# Patient Record
Sex: MALE | Race: AMERICAN INDIAN OR ALASKA NATIVE | NOT HISPANIC OR LATINO
[De-identification: names, ages, dates, MRNs, and addresses within clinical notes are randomized per-mention and may not be internally consistent; named-entity substitution may affect disease eponyms.]

---

## 2020-06-01 PROBLEM — Z00.00 ENCOUNTER FOR PREVENTIVE HEALTH EXAMINATION: Status: ACTIVE | Noted: 2020-06-01

## 2020-06-09 ENCOUNTER — APPOINTMENT (OUTPATIENT)
Dept: ENDOCRINOLOGY | Facility: CLINIC | Age: 65
End: 2020-06-09

## 2021-03-07 ENCOUNTER — TRANSCRIPTION ENCOUNTER (OUTPATIENT)
Age: 66
End: 2021-03-07

## 2021-09-06 ENCOUNTER — EMERGENCY (EMERGENCY)
Facility: HOSPITAL | Age: 66
LOS: 1 days | Discharge: HOME | End: 2021-09-06
Payer: MEDICARE

## 2021-09-06 VITALS
SYSTOLIC BLOOD PRESSURE: 198 MMHG | OXYGEN SATURATION: 99 % | DIASTOLIC BLOOD PRESSURE: 97 MMHG | TEMPERATURE: 98 F | RESPIRATION RATE: 16 BRPM | HEART RATE: 80 BPM

## 2021-09-06 LAB — SARS-COV-2 RNA SPEC QL NAA+PROBE: SIGNIFICANT CHANGE UP

## 2021-09-06 PROCEDURE — U0003: CPT

## 2021-09-06 PROCEDURE — U0005: CPT

## 2021-09-06 PROCEDURE — 99283 EMERGENCY DEPT VISIT LOW MDM: CPT

## 2021-09-06 PROCEDURE — 99282 EMERGENCY DEPT VISIT SF MDM: CPT | Mod: CS

## 2021-09-06 NOTE — ED PROVIDER NOTE - DOMESTIC TRAVEL HIGH RISK QUESTION
"NEUROLOGY FLOOR CONSULT    Reason for consult: Stroke    Informant: ER physician       Other sources of information : Patient and chart review    CC:  "Right arm weakness"    HPI:   Pedro Ponce Jr. is a 79 y.o. year old RHM with history of HTN, DM ,  HLD, BPH was stroke activated this morning with symptoms of right arm numbness and weakness that started over the last 2 days and progressing. Patient mention he was squeezing a ball with his hands daily for strength and noticed some weakness over the past two days. Patient also mention tingling in all fingers of B/L hands and B/L legs left L>R. He is noted to have BP of 176/80's  While in ER and currently on 5 antihypertensive pills. During stroke activation patients NIHSS of 1 for right hand droop. Patient mentions he uses glasses at baseline, denies any vision problems, speech problems or balance issues. Per patient s wife his face is at his baseline and uses dentures. He underwent hernia repair on Monday and recently visiting ER 2-3 times over the last month for uncontrolled BP. Patient does workout at gym, last time he went to gym was 2 weeks ago.    Patient admits to taking baby aspirin everyday    ROS: As per HPI    Histories:     Allergies:  Pcn [penicillins]    Current Medications:    Current Facility-Administered Medications   Medication Dose Route Frequency Provider Last Rate Last Dose    aspirin EC tablet 81 mg  81 mg Oral Daily Sariah Slade NP   81 mg at 12/06/20 1223    atorvastatin tablet 40 mg  40 mg Oral Daily Sariah Slade NP   40 mg at 12/06/20 1223    enoxaparin injection 40 mg  40 mg Subcutaneous Q24H Sariah Slade NP        labetaloL injection 10 mg  10 mg Intravenous Q6H PRN Sariah Slade NP        ondansetron disintegrating tablet 8 mg  8 mg Oral Q8H PRN Sariah Slade NP        ondansetron injection 4 mg  4 mg Intravenous Q8H PRN Sariah Slade NP     "    sodium chloride 0.9% flush 10 mL  10 mL Intravenous PRN Sariah Slade NP         Current Outpatient Medications   Medication Sig Dispense Refill    aspirin 81 mg Tab Take 81 mg by mouth. 1 Tablet Oral Every evening      atorvastatin (LIPITOR) 10 MG tablet Take 10 mg by mouth every evening.      chlorhexidine (PERIDEX) 0.12 % solution RINSE 1/2 OZ TWICE DAILY AFTER BREAKFAST AND BEFORE BEDTIME      dutasteride (AVODART) 0.5 mg capsule TAKE 1 CAPSULE (0.5 MG TOTAL) BY MOUTH ONCE DAILY. 90 capsule 3    flu vacc [id number],65yr up,PF (FLUZONE HIGH-DOSE 2019-20, PF,) 180 mcg/0.5 mL Syrg uud 0.5 mL 0    flu vacc [id number],65yr up,PF (FLUZONE HIGH-DOSE 2019-20, PF,) 180 mcg/0.5 mL Syrg admin as directed 0.5 mL 0    FLUZONE HIGHDOSE QUAD 20-21  mcg/0.7 mL Syrg TO BE ADMINISTERED BY PHARMACIST FOR IMMUNIZATION      hydrALAZINE (APRESOLINE) 50 MG tablet Take 100 mg by mouth 3 (three) times daily. Per Dr. Mcdonald       irbesartan (AVAPRO) 300 MG tablet TAKE 1 TABLET EVERY DAY 90 tablet 3    metoprolol tartrate (LOPRESSOR) 50 MG tablet Take 0.5 tablets (25 mg total) by mouth 2 (two) times daily. 90 tablet 3    NIFEdipine (PROCARDIA-XL) 60 MG (OSM) 24 hr tablet Take 1 tablet (60 mg total) by mouth 2 (two) times daily. 180 tablet 4    oxybutynin (DITROPAN-XL) 10 MG 24 hr tablet Take 1 tablet (10 mg total) by mouth once daily. 90 tablet 3    oxyCODONE-acetaminophen (PERCOCET) 5-325 mg per tablet Take 1 tablet by mouth every 4 to 6 hours as needed for Pain. 20 tablet 0    senna-docusate 8.6-50 mg (SENNA WITH DOCUSATE SODIUM) 8.6-50 mg per tablet Take 1 tablet by mouth once daily. 7 tablet 0    spironolactone (ALDACTONE) 50 MG tablet Take 1 tablet (50 mg total) by mouth once daily. 90 tablet 3    tamsulosin (FLOMAX) 0.4 mg Cap Take 1 capsule (0.4 mg total) by mouth every evening. 90 capsule 3    varicella-zoster [id number], PF, (SHINGRIX, PF,) 50 mcg/0.5 mL injection Inject into the muscle. 0.5  mL 1       Past Medical/Surgical/Family History:  Medical:   Past Medical History:   Diagnosis Date    BPH (benign prostatic hypertrophy)     Cataract     Colon polyp     Diabetes mellitus     Diabetes mellitus type II     diet-controlled    Glaucoma (increased eye pressure)     Gout, joint     Hyperlipidemia     Hypertension     Macular degeneration     Obesity     Retinal pigment epithelial detachment 3/15/2013      Surgeries:   Past Surgical History:   Procedure Laterality Date    COLONOSCOPY N/A 4/9/2018    Procedure: COLONOSCOPY;  Surgeon: HAIM Mir MD;  Location: [facility] ENDO (4TH FLR);  Service: Endoscopy;  Laterality: N/A;    COLONOSCOPY N/A 1/14/2019    Procedure: COLONOSCOPY;  Surgeon: HAIM Mir MD;  Location: [facility] ENDO (4TH FLR);  Service: Endoscopy;  Laterality: N/A;  PM prep    CYST REMOVAL  2000    removed from left kidney    KIDNEY SURGERY      PROSTATE SURGERY        Family:   Family History   Problem Relation Age of Onset    Cancer Mother         unkn ca    Diabetes Mother     Glaucoma Mother     Heart disease Mother     Hypertension Mother     Blindness Neg Hx     Macular degeneration Neg Hx     Retinal detachment Neg Hx    , no family history of nerve or muscle disease    Social History:    Substance Abuse/Dependence History:  Tobacco: denied  EtOH: social drinker in the past not currently drinking  Ilicits: None    Occupational/Employment History:  Occupation: Retired      Current Evaluation:     Vital Signs:   Vitals:    12/06/20 1159   BP: (!) 148/70   Pulse: 98   Resp: 20   Temp: 98.2 °F (36.8 °C)      NIHSS - on my exam is 0, but tingling sensation in B/L upper and left lower>right    ORIENTATION: AOx4    MEMORY: recent and remote memory intact    LANGUAGE: 0=No aphasia, normal    CRANIAL NERVES:  Pupils small less reactive B/L, EOMs intact, no facial asymmetry, tongue, palate midline, no dysarthria, SCM & TPZ intact    MOTOR:  Pronator drift:  absent    Right UE  5/5  Left UE   5/5  Right deltoid  5/5  Left deltoid  5/5  Right biceps  5/5  Left biceps  5/5  Right hip flexor  5/5  Left hip flexor  5/5  Right anterior tibialis  5/5  Left anterior tibialis  5/5  Ankle - plantar and dorsal flexion 5/5 bilaterally and inversion and eversion 5/5 bilaterally    no evidence of contractility    Tone: normal    DTR'S:  1+ B/L biceps, triceps, 2+ B/L knee and ankle  Babinski down going bilaterally    SENSORY:  Intact to light touch and vibration. Patient mentions he has tingling sensation in both hands and left ankle more than right    CEREBELLAR/GAIT:  Finger to nose:normal  Heel to shin:normal  Gait: Unable to assess, ambulates normally at home    LABORATORY STUDIES:  Recent Results (from the past 24 hour(s))   POCT glucose    Collection Time: 12/06/20  9:10 AM   Result Value Ref Range    POCT Glucose 111 (H) 70 - 110 mg/dL   ISTAT PROCEDURE    Collection Time: 12/06/20  9:10 AM   Result Value Ref Range    POC PTWBT 11.7 9.7 - 14.3 sec    POC PTINR 1.0 0.9 - 1.2    Sample VENOUS     DelSys Room Air    ISTAT CREATININE    Collection Time: 12/06/20  9:12 AM   Result Value Ref Range    POC Creatinine 1.3 0.5 - 1.4 mg/dL    Sample VENOUS     DelSys Room Air    CBC W/ AUTO DIFFERENTIAL    Collection Time: 12/06/20  9:15 AM   Result Value Ref Range    WBC 4.42 3.90 - 12.70 K/uL    RBC 4.52 (L) 4.60 - 6.20 M/uL    Hemoglobin 14.1 14.0 - 18.0 g/dL    Hematocrit 41.5 40.0 - 54.0 %    MCV 92 82 - 98 fL    MCH 31.2 (H) 27.0 - 31.0 pg    MCHC 34.0 32.0 - 36.0 g/dL    RDW 13.4 11.5 - 14.5 %    Platelets 259 150 - 350 K/uL    MPV 9.7 9.2 - 12.9 fL    Immature Granulocytes 0.2 0.0 - 0.5 %    Gran # (ANC) 2.1 1.8 - 7.7 K/uL    Immature Grans (Abs) 0.01 0.00 - 0.04 K/uL    Lymph # 1.8 1.0 - 4.8 K/uL    Mono # 0.5 0.3 - 1.0 K/uL    Eos # 0.1 0.0 - 0.5 K/uL    Baso # 0.04 0.00 - 0.20 K/uL    nRBC 0 0 /100 WBC    Gran % 46.4 38.0 - 73.0 %    Lymph % 39.8 18.0 - 48.0 %    Mono  % 10.2 4.0 - 15.0 %    Eosinophil % 2.5 0.0 - 8.0 %    Basophil % 0.9 0.0 - 1.9 %    Differential Method Automated    Comprehensive metabolic panel    Collection Time: 12/06/20  9:15 AM   Result Value Ref Range    Sodium 138 136 - 145 mmol/L    Potassium 3.9 3.5 - 5.1 mmol/L    Chloride 106 95 - 110 mmol/L    CO2 18 (L) 23 - 29 mmol/L    Glucose 108 70 - 110 mg/dL    BUN 20 8 - 23 mg/dL    Creatinine 1.2 0.5 - 1.4 mg/dL    Calcium 10.5 8.7 - 10.5 mg/dL    Total Protein 7.3 6.0 - 8.4 g/dL    Albumin 3.9 3.5 - 5.2 g/dL    Total Bilirubin 1.0 0.1 - 1.0 mg/dL    Alkaline Phosphatase 59 55 - 135 U/L    AST 16 10 - 40 U/L    ALT 11 10 - 44 U/L    Anion Gap 14 8 - 16 mmol/L    eGFR if African American >60 >60 mL/min/1.73 m^2    eGFR if non African American 56 (A) >60 mL/min/1.73 m^2   TSH    Collection Time: 12/06/20  9:15 AM   Result Value Ref Range    TSH 2.268 0.400 - 4.000 uIU/mL   LDL - Lipid Panel    Collection Time: 12/06/20  9:15 AM   Result Value Ref Range    Cholesterol 139 120 - 199 mg/dL    Triglycerides 63 30 - 150 mg/dL    HDL 53 40 - 75 mg/dL    LDL Cholesterol 73.4 63.0 - 159.0 mg/dL    HDL/Cholesterol Ratio 38.1 20.0 - 50.0 %    Total Cholesterol/HDL Ratio 2.6 2.0 - 5.0    Non-HDL Cholesterol 86 mg/dL   Troponin I    Collection Time: 12/06/20  9:15 AM   Result Value Ref Range    Troponin I <0.006 0.000 - 0.026 ng/mL   B-Type natriuretic peptide    Collection Time: 12/06/20  9:15 AM   Result Value Ref Range    BNP 60 0 - 99 pg/mL   Hemoglobin A1C    Collection Time: 12/06/20  9:17 AM   Result Value Ref Range    Hemoglobin A1C 5.9 (H) 4.0 - 5.6 %    Estimated Avg Glucose 123 68 - 131 mg/dL   Urinalysis, Reflex to Urine Culture Urine, Clean Catch    Collection Time: 12/06/20  9:23 AM    Specimen: Urine   Result Value Ref Range    Specimen UA Urine, Clean Catch     Color, UA Yellow Yellow, Straw, Kiana    Appearance, UA Clear Clear    pH, UA 5.0 5.0 - 8.0    Specific Gravity, UA 1.015 1.005 - 1.030    Protein,  No UA Negative Negative    Glucose, UA Negative Negative    Ketones, UA Negative Negative    Bilirubin (UA) Negative Negative    Occult Blood UA Negative Negative    Nitrite, UA Negative Negative    Urobilinogen, UA Negative <2.0 EU/dL    Leukocytes, UA Negative Negative   COVID-19 Rapid Screening    Collection Time: 12/06/20 10:27 AM   Result Value Ref Range    SARS-CoV-2 RNA, Amplification, Qual Negative Negative   Urinalysis, Reflex to Urine Culture Urine, Clean Catch    Collection Time: 12/06/20 10:55 AM    Specimen: Urine   Result Value Ref Range    Specimen UA Urine, Clean Catch     Color, UA Yellow Yellow, Straw, Kiana    Appearance, UA Clear Clear    pH, UA 7.0 5.0 - 8.0    Specific Gravity, UA 1.015 1.005 - 1.030    Protein, UA Trace (A) Negative    Glucose, UA Negative Negative    Ketones, UA Negative Negative    Bilirubin (UA) Negative Negative    Occult Blood UA Negative Negative    Nitrite, UA Negative Negative    Urobilinogen, UA Negative <2.0 EU/dL    Leukocytes, UA Trace (A) Negative   Urinalysis Microscopic    Collection Time: 12/06/20 10:55 AM   Result Value Ref Range    WBC, UA 12 (H) 0 - 5 /hpf    WBC Clumps, UA Occasional (A) None-Rare    Microscopic Comment SEE COMMENT        Thyroid normal  HgA1C%:  5.9  Vit B12: -  Folate:  -    RADIOLOGY STUDIES:  I have personally reviewed the images performed.     HEAD CT: No acute bleed/ small microvascular ischemic changes through out, few small areas of hypoattenuation possible small vessel disease     BRAIN MRI: in process      Assessment:  DARVIN Ponce  is a 79 y.o. year old with history of HTN, DM ,  HLD, BPH was stroke activated this morning with symptoms of right arm numbness and weakness that sarted over the last 2 days and progressing. Patient has NIHSS of 1 this morning was consulted neurology for stroke work up. Patient noted to have elevated BP this morning, with NIHSS of 0, did not notice any abnormal strength on exam, but patient  complains of tingling sensation in both hand left lower extremity more than right. CT head negative for bleeds with chronic microvascular ischemic disease. Patient non focal symptoms not consistent with acute ischemic stroke but given his risk factor of HTN, DM and chronic ischemic changes on CT head will work up for Stroke to rule out acute on chronic stroke.     - Follow up MRI brain  - Permissive HTN for 24 hours till 12:00 am tomorrow  - TTE with bubble study  - Continue home ASA 81 for now will evaluate the need for additional antiplatelet if MRI positive for acute stroke  - PT/OT/SLP  - Follow up with B12 and folate  - Normal TSH  - Hba1c - 5.9  -  Follow up with Neurology as outpatient for peripheral sensory neuropathy evaluation.    Differential diagnosis was explained to the patient. All questions were answered. Patient understood and agreed to adhere to plan.     Please call Neurology for any additional questions.      Case discussed with Dr. Kenn Silverman MD  [employer] Neurology PGY-3

## 2021-09-06 NOTE — ED PROVIDER NOTE - OBJECTIVE STATEMENT
Pt presenting to the ER for COVID-19 testing. Pt states he needs testing prior to travel. No symptoms or complaints at this time.

## 2021-09-06 NOTE — ED PROVIDER NOTE - PATIENT PORTAL LINK FT
You can access the FollowMyHealth Patient Portal offered by [facility] by registering at the following website: [email or web address]. By joining [employer]’s FollowMyHealth portal, you will also be able to view your health information using other applications (apps) compatible with our system.

## 2021-09-06 NOTE — ED PROVIDER NOTE - CLINICAL SUMMARY MEDICAL DECISION MAKING FREE TEXT BOX
Pt nontoxic appearing, stable vitals, ambulatory with stable saturation without supplemental oxygen. PT does not meet criteria listed in most updated guidelines as per [facility] protocol/algorithm for admission at this time. pt advised about self-quarantine instructions until negative test results and/or symptom resolution. pt advised on hand hygiene, monitoring of symptoms, antipyretic use as well as and fu with primary care provider. Instructions given in pre-printed copy. COVID-19 PCR sent and pt given strict return precautions. Advised to take BP medication and follow up with PCP for repeat BP check.

## 2021-09-06 NOTE — ED ADULT TRIAGE NOTE - CHIEF COMPLAINT QUOTE
requesting covid swab for travel, denies symptoms.  hypertensive in triage, states he did not take his BP medication yet today.

## 2022-06-11 ENCOUNTER — NON-APPOINTMENT (OUTPATIENT)
Age: 67
End: 2022-06-11

## 2023-05-01 LAB
HBA1C MFR BLD HPLC: 6.8
LDLC SERPL DIRECT ASSAY-MCNC: 146
MICROALBUMIN/CREAT 24H UR-RTO: 59

## 2023-05-02 ENCOUNTER — APPOINTMENT (OUTPATIENT)
Dept: ENDOCRINOLOGY | Facility: CLINIC | Age: 68
End: 2023-05-02

## 2023-09-04 ENCOUNTER — NON-APPOINTMENT (OUTPATIENT)
Age: 68
End: 2023-09-04
